# Patient Record
Sex: MALE | Race: ASIAN | Employment: OTHER | URBAN - METROPOLITAN AREA
[De-identification: names, ages, dates, MRNs, and addresses within clinical notes are randomized per-mention and may not be internally consistent; named-entity substitution may affect disease eponyms.]

---

## 2017-01-14 PROCEDURE — 84460 ALANINE AMINO (ALT) (SGPT): CPT | Performed by: FAMILY MEDICINE

## 2017-01-14 PROCEDURE — 36415 COLL VENOUS BLD VENIPUNCTURE: CPT | Performed by: FAMILY MEDICINE

## 2017-11-21 PROBLEM — M77.01 MEDIAL EPICONDYLITIS OF ELBOW, RIGHT: Status: ACTIVE | Noted: 2017-11-21

## 2017-11-21 PROBLEM — M25.521 RIGHT ELBOW PAIN: Status: ACTIVE | Noted: 2017-11-21

## 2019-03-01 PROCEDURE — 86803 HEPATITIS C AB TEST: CPT | Performed by: DERMATOLOGY

## 2019-03-01 PROCEDURE — 86706 HEP B SURFACE ANTIBODY: CPT | Performed by: DERMATOLOGY

## 2019-03-01 PROCEDURE — 86704 HEP B CORE ANTIBODY TOTAL: CPT | Performed by: DERMATOLOGY

## 2019-03-01 PROCEDURE — 86480 TB TEST CELL IMMUN MEASURE: CPT | Performed by: DERMATOLOGY

## 2019-03-01 PROCEDURE — 87340 HEPATITIS B SURFACE AG IA: CPT | Performed by: DERMATOLOGY

## 2021-08-08 ENCOUNTER — HOSPITAL ENCOUNTER (EMERGENCY)
Age: 26
Discharge: HOME | End: 2021-08-08
Attending: EMERGENCY MEDICINE
Payer: COMMERCIAL

## 2021-08-08 ENCOUNTER — APPOINTMENT (OUTPATIENT)
Dept: MRI IMAGING | Age: 26
End: 2021-08-08
Attending: EMERGENCY MEDICINE
Payer: COMMERCIAL

## 2021-08-08 VITALS
RESPIRATION RATE: 16 BRPM | WEIGHT: 173 LBS | BODY MASS INDEX: 27.8 KG/M2 | HEART RATE: 99 BPM | HEIGHT: 66 IN | DIASTOLIC BLOOD PRESSURE: 87 MMHG | OXYGEN SATURATION: 100 % | TEMPERATURE: 98 F | SYSTOLIC BLOOD PRESSURE: 104 MMHG

## 2021-08-08 DIAGNOSIS — R20.2 PARESTHESIAS: Primary | ICD-10-CM

## 2021-08-08 LAB
ANION GAP SERPL CALC-SCNC: 6 MMOL/L (ref 0–18)
BASOPHILS # BLD AUTO: 0.02 X10(3) UL (ref 0–0.2)
BASOPHILS NFR BLD AUTO: 0.2 %
BUN BLD-MCNC: 9 MG/DL (ref 7–18)
CALCIUM BLD-MCNC: 9.1 MG/DL (ref 8.5–10.1)
CHLORIDE SERPL-SCNC: 106 MMOL/L (ref 98–112)
CO2 SERPL-SCNC: 26 MMOL/L (ref 21–32)
CREAT BLD-MCNC: 1.09 MG/DL
EOSINOPHIL # BLD AUTO: 0.26 X10(3) UL (ref 0–0.7)
EOSINOPHIL NFR BLD AUTO: 3 %
ERYTHROCYTE [DISTWIDTH] IN BLOOD BY AUTOMATED COUNT: 12.2 %
GLUCOSE BLD-MCNC: 86 MG/DL (ref 70–99)
HCT VFR BLD AUTO: 44.8 %
HGB BLD-MCNC: 15.6 G/DL
IMM GRANULOCYTES # BLD AUTO: 0.02 X10(3) UL (ref 0–1)
IMM GRANULOCYTES NFR BLD: 0.2 %
LYMPHOCYTES # BLD AUTO: 2.1 X10(3) UL (ref 1–4)
LYMPHOCYTES NFR BLD AUTO: 24.1 %
MCH RBC QN AUTO: 30.6 PG (ref 26–34)
MCHC RBC AUTO-ENTMCNC: 34.8 G/DL (ref 31–37)
MCV RBC AUTO: 88 FL
MONOCYTES # BLD AUTO: 0.64 X10(3) UL (ref 0.1–1)
MONOCYTES NFR BLD AUTO: 7.3 %
NEUTROPHILS # BLD AUTO: 5.69 X10 (3) UL (ref 1.5–7.7)
NEUTROPHILS # BLD AUTO: 5.69 X10(3) UL (ref 1.5–7.7)
NEUTROPHILS NFR BLD AUTO: 65.2 %
OSMOLALITY SERPL CALC.SUM OF ELEC: 284 MOSM/KG (ref 275–295)
PLATELET # BLD AUTO: 272 10(3)UL (ref 150–450)
POTASSIUM SERPL-SCNC: 4.1 MMOL/L (ref 3.5–5.1)
RBC # BLD AUTO: 5.09 X10(6)UL
SODIUM SERPL-SCNC: 138 MMOL/L (ref 136–145)
WBC # BLD AUTO: 8.7 X10(3) UL (ref 4–11)

## 2021-08-08 PROCEDURE — 72156 MRI NECK SPINE W/O & W/DYE: CPT | Performed by: EMERGENCY MEDICINE

## 2021-08-08 PROCEDURE — 96360 HYDRATION IV INFUSION INIT: CPT

## 2021-08-08 PROCEDURE — 85025 COMPLETE CBC W/AUTO DIFF WBC: CPT | Performed by: EMERGENCY MEDICINE

## 2021-08-08 PROCEDURE — 70553 MRI BRAIN STEM W/O & W/DYE: CPT | Performed by: EMERGENCY MEDICINE

## 2021-08-08 PROCEDURE — 99284 EMERGENCY DEPT VISIT MOD MDM: CPT

## 2021-08-08 PROCEDURE — 80048 BASIC METABOLIC PNL TOTAL CA: CPT | Performed by: EMERGENCY MEDICINE

## 2021-08-08 PROCEDURE — A9575 INJ GADOTERATE MEGLUMI 0.1ML: HCPCS | Performed by: EMERGENCY MEDICINE

## 2021-08-08 NOTE — ED INITIAL ASSESSMENT (HPI)
Pt states he started feeling tingling in his left fingers 1 month ago, lest week Pt states the tingling/numbness started to radiate up his left arm.  Friday Pt states he started to get the same tingling/numbness in his right arm and yesterday pt states he n

## 2021-08-08 NOTE — ED PROVIDER NOTES
Patient Seen in: [facility] In [place]      History   Patient presents with:  Numbness    Stated Complaint: Bilateal hand/arm tingling x 1 month. Right foot tingling since yesterday.      HPI/Subjective:   HPI    Patient tells me that social only reported    Drug use: No             Review of Systems    Positive for stated complaint: Bilateal hand/arm tingling x 1 month. Right foot tingling since yesterday. Other systems are as noted in HPI. Constitutional and vital signs reviewed. The following orders were created for panel order CBC With Differential With Platelet.   Procedure                               Abnormality         Status                     ---------                               -----------         ------ [facility]  [phone]  Call  choose option 1 for general neurology          Medications Prescribed:  Current Discharge Medication List

## 2021-08-12 ENCOUNTER — LAB ENCOUNTER (OUTPATIENT)
Dept: LAB | Age: 26
End: 2021-08-12
Attending: Other
Payer: COMMERCIAL

## 2021-08-12 ENCOUNTER — OFFICE VISIT (OUTPATIENT)
Dept: NEUROLOGY | Facility: CLINIC | Age: 26
End: 2021-08-12
Payer: COMMERCIAL

## 2021-08-12 ENCOUNTER — TELEPHONE (OUTPATIENT)
Dept: NEUROLOGY | Facility: CLINIC | Age: 26
End: 2021-08-12

## 2021-08-12 VITALS
RESPIRATION RATE: 16 BRPM | BODY MASS INDEX: 27.8 KG/M2 | HEART RATE: 96 BPM | HEIGHT: 66 IN | SYSTOLIC BLOOD PRESSURE: 137 MMHG | WEIGHT: 173 LBS | DIASTOLIC BLOOD PRESSURE: 85 MMHG

## 2021-08-12 DIAGNOSIS — Z79.899 ADALIMUMAB (HUMIRA) LONG-TERM USE: ICD-10-CM

## 2021-08-12 DIAGNOSIS — L40.9 PSORIASIS: ICD-10-CM

## 2021-08-12 DIAGNOSIS — R20.2 PARESTHESIAS: ICD-10-CM

## 2021-08-12 DIAGNOSIS — G37.9 DEMYELINATING DISEASE (HCC): ICD-10-CM

## 2021-08-12 DIAGNOSIS — G37.9 DEMYELINATING DISEASE (HCC): Primary | ICD-10-CM

## 2021-08-12 LAB
CRP SERPL-MCNC: <0.29 MG/DL (ref ?–0.3)
RHEUMATOID FACT SERPL-ACNC: <10 IU/ML (ref ?–15)
SED RATE-ML: 9 MM/HR
VIT B12 SERPL-MCNC: 721 PG/ML (ref 193–986)

## 2021-08-12 PROCEDURE — 85652 RBC SED RATE AUTOMATED: CPT | Performed by: OTHER

## 2021-08-12 PROCEDURE — 86431 RHEUMATOID FACTOR QUANT: CPT | Performed by: OTHER

## 2021-08-12 PROCEDURE — 86255 FLUORESCENT ANTIBODY SCREEN: CPT | Performed by: OTHER

## 2021-08-12 PROCEDURE — 84165 PROTEIN E-PHORESIS SERUM: CPT | Performed by: OTHER

## 2021-08-12 PROCEDURE — 86038 ANTINUCLEAR ANTIBODIES: CPT | Performed by: OTHER

## 2021-08-12 PROCEDURE — 86140 C-REACTIVE PROTEIN: CPT | Performed by: OTHER

## 2021-08-12 PROCEDURE — 83883 ASSAY NEPHELOMETRY NOT SPEC: CPT | Performed by: OTHER

## 2021-08-12 PROCEDURE — 99204 OFFICE O/P NEW MOD 45 MIN: CPT | Performed by: OTHER

## 2021-08-12 PROCEDURE — 83876 ASSAY MYELOPEROXIDASE: CPT | Performed by: OTHER

## 2021-08-12 PROCEDURE — 82607 VITAMIN B-12: CPT | Performed by: OTHER

## 2021-08-12 PROCEDURE — 86334 IMMUNOFIX E-PHORESIS SERUM: CPT | Performed by: OTHER

## 2021-08-12 PROCEDURE — 3075F SYST BP GE 130 - 139MM HG: CPT | Performed by: OTHER

## 2021-08-12 PROCEDURE — 83516 IMMUNOASSAY NONANTIBODY: CPT | Performed by: OTHER

## 2021-08-12 PROCEDURE — 3008F BODY MASS INDEX DOCD: CPT | Performed by: OTHER

## 2021-08-12 PROCEDURE — 3079F DIAST BP 80-89 MM HG: CPT | Performed by: OTHER

## 2021-08-12 RX ORDER — GABAPENTIN 100 MG/1
100 CAPSULE ORAL 3 TIMES DAILY
Qty: 90 CAPSULE | Refills: 0 | Status: SHIPPED | OUTPATIENT
Start: 2021-08-12 | End: 2021-08-26

## 2021-08-12 NOTE — H&P
[facility] Patient / Consult Visit    Maykel Martinez is a 27year old male. Referring MD: No ref.  provider found    Patient presents with:  Neurologic Problem: C/O of numbmess and tingling in upper and lower Vaping Use: Never used    Alcohol use:  Yes      Alcohol/week: 1.0 standard drinks      Types: 1 Standard drinks or equivalent per week      Comment: social only reported    Drug use: No    Family History   Problem Relation Age of Onset   • Heart Disease Ma no cyanosis, peripheral pulses intact    Neck: Supple; full range of motion; no carotid bruits    Mental status:  Alert and oriented to time, place, person, and situation  Speech: fluent  Language: normal naming, repetition, and comprehension  Memory: norm Prelim Neutrophil Abs      1.50 - 7.70 x10 (3) uL 5.69   Neutrophils Absolute      1.50 - 7.70 x10(3) uL 5.69   Lymphocytes Absolute      1.00 - 4.00 x10(3) uL 2.10   Monocytes Absolute      0.10 - 1.00 x10(3) uL 0.64   Eosinophils Absolute      0.00 - 0 lesions with low T1 signal R > L           MRI cervical spine: independently reviewed; no obvious spinal canal stenosis or intramedullary signal change or demyelinating plaques noted;     IMPRESSION AND PLAN:   Uday Ohara is a 27year old male with PMH with his dermatologist further. Given the progressive paresthesias that he is experiencing, along with findings on MRI of the the brain, further immediate evaluation for potential demyelinating disease or mimickers of demyelinating disease is warranted. C-REACTIVE PROTEIN, BERTA, DIRECT, REFLEX TO 9         ENAS, ANCA PANEL VASCULITIS W/REFLEX,         RHEUMATOID ARTHRITIS FACTOR, VITAMIN B12, SED         RATE, WESTERGREN (AUTOMATED), CELL COUNT, CSF,         CYTOLOGY FLUIDS, GLUCOSE, CEREBROSPINAL FLU

## 2021-08-12 NOTE — TELEPHONE ENCOUNTER
Dr. Ezra Georges requested expedited pa for MRI Spine Thoracic. Advised pt will contact him once pa completed.     Call pt [phone]

## 2021-08-13 LAB — ANA SCREEN: NEGATIVE

## 2021-08-15 LAB
MYELOPEROX ANTIBODIES, IGG: 1 AU/ML
SERINE PROTEASE3, IGG: 3 AU/ML

## 2021-08-16 ENCOUNTER — TELEPHONE (OUTPATIENT)
Dept: NEUROLOGY | Facility: CLINIC | Age: 26
End: 2021-08-16

## 2021-08-16 NOTE — TELEPHONE ENCOUNTER
Patient presented to the office with forms for his employer. He gave the work excuse letter from Dr. Moise Handy, but they wanted Dr. Moise Handy to fill out an accommodation form.      Patient explained that he does plan to return to [place] to resume work on 8/27 Reason for Call:  Other call back    Detailed comments: states is confused how to use neb solutions-separate or mixed together    Phone Number Patient can be reached at: Cell number on file:    Telephone Information:   Mobile [phone]       Best Time: wants to start today    Can we leave a detailed message on this number? YES    Call taken on 3/16/2018 at 12:14 PM by ABISAI HOLDER

## 2021-08-17 ENCOUNTER — LAB ENCOUNTER (OUTPATIENT)
Dept: LAB | Facility: HOSPITAL | Age: 26
End: 2021-08-17
Attending: Other
Payer: COMMERCIAL

## 2021-08-17 DIAGNOSIS — Z79.899 ADALIMUMAB (HUMIRA) LONG-TERM USE: ICD-10-CM

## 2021-08-17 DIAGNOSIS — L40.9 PSORIASIS: ICD-10-CM

## 2021-08-17 DIAGNOSIS — G37.9 DEMYELINATING DISEASE (HCC): ICD-10-CM

## 2021-08-17 LAB
ALBUMIN SERPL ELPH-MCNC: 5.07 G/DL (ref 3.75–5.21)
ALBUMIN/GLOB SERPL: 1.52 {RATIO} (ref 1–2)
ALPHA1 GLOB SERPL ELPH-MCNC: 0.25 G/DL (ref 0.19–0.46)
ALPHA2 GLOB SERPL ELPH-MCNC: 0.68 G/DL (ref 0.48–1.05)
B-GLOBULIN SERPL ELPH-MCNC: 0.9 G/DL (ref 0.68–1.23)
GAMMA GLOB SERPL ELPH-MCNC: 1.5 G/DL (ref 0.62–1.7)
KAPPA LC FREE SER-MCNC: 1.28 MG/DL (ref 0.33–1.94)
KAPPA LC FREE/LAMBDA FREE SER NEPH: 1.18 {RATIO} (ref 0.26–1.65)
LAMBDA LC FREE SERPL-MCNC: 1.09 MG/DL (ref 0.57–2.63)
M PROTEIN MFR SERPL ELPH: 8.4 G/DL (ref 6.4–8.2)

## 2021-08-18 LAB — SARS-COV-2 RNA RESP QL NAA+PROBE: NOT DETECTED

## 2021-08-18 NOTE — TELEPHONE ENCOUNTER
Form completed to allow continuous time off work from 8/8/21 through 8/26/21. Endorsed to Dr. Sophie Thao to review and sign.

## 2021-08-19 ENCOUNTER — HOSPITAL ENCOUNTER (OUTPATIENT)
Dept: MRI IMAGING | Age: 26
Discharge: HOME | End: 2021-08-19
Attending: Other
Payer: COMMERCIAL

## 2021-08-19 DIAGNOSIS — G37.9 DEMYELINATING DISEASE (HCC): ICD-10-CM

## 2021-08-19 DIAGNOSIS — Z79.899 ADALIMUMAB (HUMIRA) LONG-TERM USE: ICD-10-CM

## 2021-08-19 DIAGNOSIS — L40.9 PSORIASIS: ICD-10-CM

## 2021-08-19 PROCEDURE — A9575 INJ GADOTERATE MEGLUMI 0.1ML: HCPCS

## 2021-08-19 PROCEDURE — 72157 MRI CHEST SPINE W/O & W/DYE: CPT | Performed by: OTHER

## 2021-08-19 NOTE — TELEPHONE ENCOUNTER
Paperwork completed and signed by provider. Detailed message left for patient that paperwork is completed and he may present to office to . Paperwork placed at  and PSR placed in locked drawer.     Patient instructed to call office with

## 2021-08-20 ENCOUNTER — NURSE ONLY (OUTPATIENT)
Dept: LAB | Facility: HOSPITAL | Age: 26
End: 2021-08-20
Attending: Other
Payer: COMMERCIAL

## 2021-08-20 ENCOUNTER — HOSPITAL ENCOUNTER (OUTPATIENT)
Dept: GENERAL RADIOLOGY | Facility: HOSPITAL | Age: 26
Discharge: HOME | End: 2021-08-20
Attending: Other
Payer: COMMERCIAL

## 2021-08-20 VITALS
WEIGHT: 173 LBS | SYSTOLIC BLOOD PRESSURE: 140 MMHG | RESPIRATION RATE: 12 BRPM | HEIGHT: 66 IN | DIASTOLIC BLOOD PRESSURE: 70 MMHG | HEART RATE: 76 BPM | TEMPERATURE: 98 F | BODY MASS INDEX: 27.8 KG/M2 | OXYGEN SATURATION: 100 %

## 2021-08-20 DIAGNOSIS — Z79.899 ADALIMUMAB (HUMIRA) LONG-TERM USE: ICD-10-CM

## 2021-08-20 DIAGNOSIS — G37.9 DEMYELINATING DISEASE (HCC): Primary | ICD-10-CM

## 2021-08-20 DIAGNOSIS — L40.9 PSORIASIS: ICD-10-CM

## 2021-08-20 DIAGNOSIS — G37.9 DEMYELINATING DISEASE (HCC): ICD-10-CM

## 2021-08-20 DIAGNOSIS — Z79.620 ADALIMUMAB (HUMIRA) LONG-TERM USE: ICD-10-CM

## 2021-08-20 LAB
CLARITY CSF: CLEAR
COLOR CSF: COLORLESS
GLUCOSE CSF-MCNC: 56 MG/DL (ref 40–70)
INR BLD: 0.97 (ref 0.89–1.11)
PROT PATTERN CSF ELPH-IMP: 89.6 MG/DL (ref 15–45)
PSA SERPL DL<=0.01 NG/ML-MCNC: 13.1 SECONDS (ref 12.2–14.5)
RBC # CSF: 208 /MM3 (ref ?–1)
TOTAL VOLUME CSF: 7 ML
WBC # FLD MANUAL: 1 /MM3 (ref 0–5)

## 2021-08-20 PROCEDURE — 83916 OLIGOCLONAL BANDS: CPT

## 2021-08-20 PROCEDURE — 62328 DX LMBR SPI PNXR W/FLUOR/CT: CPT | Performed by: OTHER

## 2021-08-20 PROCEDURE — 84157 ASSAY OF PROTEIN OTHER: CPT

## 2021-08-20 PROCEDURE — 89050 BODY FLUID CELL COUNT: CPT

## 2021-08-20 PROCEDURE — 36415 COLL VENOUS BLD VENIPUNCTURE: CPT

## 2021-08-20 PROCEDURE — 82784 ASSAY IGA/IGD/IGG/IGM EACH: CPT

## 2021-08-20 PROCEDURE — 82042 OTHER SOURCE ALBUMIN QUAN EA: CPT

## 2021-08-20 PROCEDURE — 85610 PROTHROMBIN TIME: CPT

## 2021-08-20 PROCEDURE — 82040 ASSAY OF SERUM ALBUMIN: CPT

## 2021-08-20 PROCEDURE — 82945 GLUCOSE OTHER FLUID: CPT

## 2021-08-20 PROCEDURE — 88108 CYTOPATH CONCENTRATE TECH: CPT

## 2021-08-20 RX ORDER — SODIUM CHLORIDE 9 MG/ML
INJECTION, SOLUTION INTRAVENOUS CONTINUOUS
Status: DISCONTINUED | OUTPATIENT
Start: 2021-08-20 | End: 2021-08-22

## 2021-08-20 NOTE — IMAGING NOTE
PAtient received in RAD holding  Plan for Lumbar puncture today with DR Handy    Name  and allergies verified  Pertinent labs and history reviewed  NPO since last noc  Report to [facility].

## 2021-08-22 LAB
ALBUMIN, CSF: 65 MG/DL
CSF IGG/ALBUMIN RATIO: 0.13 RATIO
IMMUNOGLOBULIN G CSF: 8.5 MG/DL

## 2021-08-24 LAB
ALBUMIN INDEX: 12.9 RATIO
ALBUMIN, CSF: 64 MG/DL
ALBUMIN, SERUM/PLASMA, NEPH: 4963 MG/DL
CSF IGG SYNTHESIS RATE: <0 MG/D
CSF IGG/ALBUMIN RATIO: 0.14 RATIO
CSF OLIGOCLONAL BANDS NUMBER: 0 BANDS
CSF OLIGOCLONAL BANDS: NEGATIVE
IGG INDEX: 0.43 RATIO
IMMUNOGLOBULIN G CSF: 8.7 MG/DL
IMMUNOGLOBULIN G: 1579 MG/DL
NON GYNE INTERPRETATION: NEGATIVE

## 2021-08-26 ENCOUNTER — OFFICE VISIT (OUTPATIENT)
Dept: NEUROLOGY | Facility: CLINIC | Age: 26
End: 2021-08-26
Payer: COMMERCIAL

## 2021-08-26 VITALS
HEART RATE: 90 BPM | SYSTOLIC BLOOD PRESSURE: 125 MMHG | WEIGHT: 173 LBS | BODY MASS INDEX: 27.8 KG/M2 | HEIGHT: 66 IN | DIASTOLIC BLOOD PRESSURE: 77 MMHG | RESPIRATION RATE: 16 BRPM

## 2021-08-26 DIAGNOSIS — Z79.899 ADALIMUMAB (HUMIRA) LONG-TERM USE: Primary | ICD-10-CM

## 2021-08-26 DIAGNOSIS — G37.9 DEMYELINATING DISEASE (HCC): ICD-10-CM

## 2021-08-26 DIAGNOSIS — L40.9 PSORIASIS: ICD-10-CM

## 2021-08-26 DIAGNOSIS — R20.2 PARESTHESIAS: ICD-10-CM

## 2021-08-26 PROCEDURE — 3074F SYST BP LT 130 MM HG: CPT | Performed by: OTHER

## 2021-08-26 PROCEDURE — 3008F BODY MASS INDEX DOCD: CPT | Performed by: OTHER

## 2021-08-26 PROCEDURE — 3078F DIAST BP <80 MM HG: CPT | Performed by: OTHER

## 2021-08-26 PROCEDURE — 99214 OFFICE O/P EST MOD 30 MIN: CPT | Performed by: OTHER

## 2021-08-26 RX ORDER — GABAPENTIN 100 MG/1
200 CAPSULE ORAL 3 TIMES DAILY
Qty: 180 CAPSULE | Refills: 0 | Status: SHIPPED | OUTPATIENT
Start: 2021-08-26

## 2021-08-26 RX ORDER — METHYLPREDNISOLONE 4 MG/1
TABLET ORAL
Qty: 1 EACH | Refills: 0 | Status: SHIPPED | OUTPATIENT
Start: 2021-08-26

## 2021-08-27 NOTE — PROGRESS NOTES
[facility] Progress Note    HPI  Patient presents with:  Neurologic Problem: Demyelinating disease anf follow up aon labs       As per my initial H&P from 8/12/2021   \" Radha Solomon is a 27year old, who presents for evaluation of ti History reviewed. No pertinent surgical history.   Family History   Problem Relation Age of Onset   • Heart Disease Maternal Grandmother         CHF   • Diabetes Maternal Grandmother    • Stroke Paternal Grandfather    • High Blood Pressure Paternal Gra as noted in HPI.     Exam:  /77 (BP Location: Left arm, Patient Position: Sitting, Cuff Size: large)   Pulse 90   Resp 16   Ht 66\"   Wt 173 lb (78.5 kg)   BMI 27.92 kg/m²   Estimated body mass index is 27.92 kg/m² as calculated from the following: CSF      0.0 - 6.0 mg/dL 8.5 (H)   Albumin, CSF      0 - 35 mg/dL 65 (H)   ALBUMIN, SERUM/PLASMA, NEPH      3500 - 5200 mg/dL    ALBUMIN INDEX      0.0 - 9.0 ratio    CSF IGG SYNTHESIS RATE      <=8.0 mg/d    IGG INDEX      0.28 - 0.66 ratio    CSF IGG/ALB 0.26 - 1.65    IMMUNOGLOBULIN G      768 - 1632 mg/dL 1579   IMMUNOGLOBULIN G CSF      0.0 - 6.0 mg/dL 8.7 (H)   Albumin, CSF      0 - 35 mg/dL 64 (H)   ALBUMIN, SERUM/PLASMA, NEPH      3500 - 5200 mg/dL 4963   ALBUMIN INDEX      0.0 - 9.0 ratio 12.9 (H) IMMUNOGLOBULIN G CSF      0.0 - 6.0 mg/dL    Albumin, CSF      0 - 35 mg/dL    ALBUMIN, SERUM/PLASMA, NEPH      3500 - 5200 mg/dL    ALBUMIN INDEX      0.0 - 9.0 ratio    CSF IGG SYNTHESIS RATE      <=8.0 mg/d    IGG INDEX      0.28 - 0.66 ratio    CSF I Prominence of the epidural fat in the midthoracic spine is causing mild to moderate spinal canal stenosis with mild effacement of the thoracic spinal cord.            Prior as noted below:     Per prior review     MRI BRAIN (W+WO) (CPT=70553)     Result Da improved on its own, but otherwise has no clear clinical history of events that may suggest demyelinating disease or multiple sclerosis.     MRI of the brain was reviewed and demonstrates multiple T2/flair white matter hyperintensities in the periventricul noted above     (R20.2) Paresthesias  Plan: gabapentin 100 MG Oral Cap, EMG ([address] [place] [address]),         methylPREDNISolone (MEDROL) 4 MG Oral Tablet         Therapy Pack        As noted above     (L40.9) Psoriasis  Plan: as no

## 2021-09-14 ENCOUNTER — TELEPHONE (OUTPATIENT)
Dept: NEUROLOGY | Facility: CLINIC | Age: 26
End: 2021-09-14

## 2021-09-14 DIAGNOSIS — Z79.899 ADALIMUMAB (HUMIRA) LONG-TERM USE: Primary | ICD-10-CM

## 2021-09-14 DIAGNOSIS — G37.9 DEMYELINATING DISEASE (HCC): ICD-10-CM

## 2021-09-15 NOTE — TELEPHONE ENCOUNTER
Called and left detailed message for patient that repeat MRI orders have been entered but not to complete until November.  Patient to wait until her hears from prior authorization team.

## 2021-09-16 ENCOUNTER — TELEPHONE (OUTPATIENT)
Dept: NEUROLOGY | Facility: CLINIC | Age: 26
End: 2021-09-16

## 2021-10-07 ENCOUNTER — PROCEDURE VISIT (OUTPATIENT)
Dept: NEUROLOGY | Facility: CLINIC | Age: 26
End: 2021-10-07
Payer: COMMERCIAL

## 2021-10-07 DIAGNOSIS — Z79.899 ADALIMUMAB (HUMIRA) LONG-TERM USE: ICD-10-CM

## 2021-10-07 DIAGNOSIS — R20.2 PARESTHESIAS: Primary | ICD-10-CM

## 2021-10-07 PROCEDURE — 95911 NRV CNDJ TEST 9-10 STUDIES: CPT | Performed by: OTHER

## 2021-10-07 PROCEDURE — 95886 MUSC TEST DONE W/N TEST COMP: CPT | Performed by: OTHER

## 2021-10-07 NOTE — PROCEDURES
[address]  [address] [facility] [place], [address] [place] [address]  Ph: [phone]  FAX: [phone]        Full Name: Jack Born Gender: Male  Patient ID: [id number] YOB: 1995      Visit Date: 10/7/2021 09:19  Age: ms mVms  cm ms m/s   L Median - APB      Wrist APB 3.02 8.5 100 6.15 29.2 Wrist - APB 7        Elbow APB 7.19 8.7 102 6.41 29.3 Elbow - Wrist 24 4.17 58   L Ulnar - ADM      Wrist ADM 2.55 12.6 100 4.95 36.3 Wrist - ADM 7        B. Elbow ADM 6.25 11.6 92.3 interlatency prolongation of median relative to ulnar nerve.   6.  Right common peroneal motor response was normal; F wave response was normal.  7.  Right tibial motor response was normal; F wave response was normal.  8.  Left median motor response was norm

## 2021-11-19 ENCOUNTER — HOSPITAL ENCOUNTER (OUTPATIENT)
Dept: MRI IMAGING | Age: 26
Discharge: HOME | End: 2021-11-19
Attending: Other
Payer: COMMERCIAL

## 2021-11-19 DIAGNOSIS — G37.9 DEMYELINATING DISEASE (HCC): ICD-10-CM

## 2021-11-19 DIAGNOSIS — Z79.899 ADALIMUMAB (HUMIRA) LONG-TERM USE: ICD-10-CM

## 2021-11-19 PROCEDURE — 70553 MRI BRAIN STEM W/O & W/DYE: CPT | Performed by: OTHER

## 2021-11-19 PROCEDURE — A9575 INJ GADOTERATE MEGLUMI 0.1ML: HCPCS | Performed by: OTHER

## 2025-04-15 PROBLEM — R12 CHRONIC HEARTBURN: Status: ACTIVE | Noted: 2025-04-15

## 2025-04-15 PROBLEM — R19.4 CHANGE IN BOWEL HABIT: Status: ACTIVE | Noted: 2025-04-15

## 2025-04-30 ENCOUNTER — LAB ENCOUNTER (OUTPATIENT)
Dept: LAB | Age: 30
End: 2025-04-30
Attending: INTERNAL MEDICINE
Payer: COMMERCIAL

## 2025-04-30 DIAGNOSIS — R07.89 ATYPICAL CHEST PAIN: ICD-10-CM

## 2025-04-30 DIAGNOSIS — R12 CHRONIC HEARTBURN: ICD-10-CM

## 2025-04-30 DIAGNOSIS — R19.4 CHANGE IN BOWEL HABIT: ICD-10-CM

## 2025-04-30 LAB
ALBUMIN SERPL-MCNC: 5.3 G/DL (ref 3.2–4.8)
ALBUMIN/GLOB SERPL: 1.6 {RATIO} (ref 1–2)
ALP LIVER SERPL-CCNC: 84 U/L (ref 45–117)
ALT SERPL-CCNC: 77 U/L (ref 10–49)
ANION GAP SERPL CALC-SCNC: 10 MMOL/L (ref 0–18)
AST SERPL-CCNC: 41 U/L (ref ?–34)
BASOPHILS # BLD AUTO: 0.03 X10(3) UL (ref 0–0.2)
BASOPHILS NFR BLD AUTO: 0.4 %
BILIRUB SERPL-MCNC: 0.5 MG/DL (ref 0.3–1.2)
BUN BLD-MCNC: 13 MG/DL (ref 9–23)
CALCIUM BLD-MCNC: 10.4 MG/DL (ref 8.7–10.6)
CHLORIDE SERPL-SCNC: 103 MMOL/L (ref 98–112)
CO2 SERPL-SCNC: 27 MMOL/L (ref 21–32)
CREAT BLD-MCNC: 1.18 MG/DL (ref 0.7–1.3)
CRP SERPL-MCNC: <0.4 MG/DL (ref ?–0.5)
EGFRCR SERPLBLD CKD-EPI 2021: 84 ML/MIN/1.73M2 (ref 60–?)
EOSINOPHIL # BLD AUTO: 0.42 X10(3) UL (ref 0–0.7)
EOSINOPHIL NFR BLD AUTO: 6.1 %
ERYTHROCYTE [DISTWIDTH] IN BLOOD BY AUTOMATED COUNT: 12.2 %
FASTING STATUS PATIENT QL REPORTED: YES
GLOBULIN PLAS-MCNC: 3.4 G/DL (ref 2–3.5)
GLUCOSE BLD-MCNC: 77 MG/DL (ref 70–99)
HCT VFR BLD AUTO: 49.4 % (ref 39–53)
HGB BLD-MCNC: 17.2 G/DL (ref 13–17.5)
IMM GRANULOCYTES # BLD AUTO: 0.03 X10(3) UL (ref 0–1)
IMM GRANULOCYTES NFR BLD: 0.4 %
LYMPHOCYTES # BLD AUTO: 2.05 X10(3) UL (ref 1–4)
LYMPHOCYTES NFR BLD AUTO: 29.9 %
MCH RBC QN AUTO: 30.9 PG (ref 26–34)
MCHC RBC AUTO-ENTMCNC: 34.8 G/DL (ref 31–37)
MCV RBC AUTO: 88.7 FL (ref 80–100)
MONOCYTES # BLD AUTO: 0.43 X10(3) UL (ref 0.1–1)
MONOCYTES NFR BLD AUTO: 6.3 %
NEUTROPHILS # BLD AUTO: 3.9 X10 (3) UL (ref 1.5–7.7)
NEUTROPHILS # BLD AUTO: 3.9 X10(3) UL (ref 1.5–7.7)
NEUTROPHILS NFR BLD AUTO: 56.9 %
OSMOLALITY SERPL CALC.SUM OF ELEC: 289 MOSM/KG (ref 275–295)
PLATELET # BLD AUTO: 300 10(3)UL (ref 150–450)
POTASSIUM SERPL-SCNC: 4.1 MMOL/L (ref 3.5–5.1)
PROT SERPL-MCNC: 8.7 G/DL (ref 5.7–8.2)
RBC # BLD AUTO: 5.57 X10(6)UL (ref 4.3–5.7)
SODIUM SERPL-SCNC: 140 MMOL/L (ref 136–145)
WBC # BLD AUTO: 6.9 X10(3) UL (ref 4–11)

## 2025-04-30 PROCEDURE — 85025 COMPLETE CBC W/AUTO DIFF WBC: CPT

## 2025-04-30 PROCEDURE — 36415 COLL VENOUS BLD VENIPUNCTURE: CPT

## 2025-04-30 PROCEDURE — 86140 C-REACTIVE PROTEIN: CPT

## 2025-04-30 PROCEDURE — 80053 COMPREHEN METABOLIC PANEL: CPT

## 2025-05-08 ENCOUNTER — LAB ENCOUNTER (OUTPATIENT)
Dept: LAB | Age: 30
End: 2025-05-08
Attending: INTERNAL MEDICINE
Payer: COMMERCIAL

## 2025-05-08 DIAGNOSIS — R74.8 ELEVATED LIVER ENZYMES: ICD-10-CM

## 2025-05-08 LAB
CERULOPLASMIN SERPL-MCNC: 25 MG/DL (ref 20–60)
DEPRECATED HBV CORE AB SER IA-ACNC: 422 NG/ML (ref 50–336)
IRON SATN MFR SERPL: 21 % (ref 20–50)
IRON SERPL-MCNC: 73 UG/DL (ref 65–175)
TOTAL IRON BINDING CAPACITY: 350 UG/DL (ref 250–425)
TRANSFERRIN SERPL-MCNC: 286 MG/DL (ref 215–365)

## 2025-05-08 PROCEDURE — 83540 ASSAY OF IRON: CPT

## 2025-05-08 PROCEDURE — 83516 IMMUNOASSAY NONANTIBODY: CPT

## 2025-05-08 PROCEDURE — 83550 IRON BINDING TEST: CPT

## 2025-05-08 PROCEDURE — 82728 ASSAY OF FERRITIN: CPT

## 2025-05-08 PROCEDURE — 82103 ALPHA-1-ANTITRYPSIN TOTAL: CPT

## 2025-05-08 PROCEDURE — 86038 ANTINUCLEAR ANTIBODIES: CPT

## 2025-05-08 PROCEDURE — 36415 COLL VENOUS BLD VENIPUNCTURE: CPT

## 2025-05-08 PROCEDURE — 82390 ASSAY OF CERULOPLASMIN: CPT

## 2025-05-10 LAB
A-1-ANTITRYPSIN: 128 MG/DL
ACTIN SMOOTH MUSCLE AB: 6 UNITS
M2 MITOCHONDRIAL AB: <20 UNITS

## 2025-05-12 LAB — NUCLEAR IGG TITR SER IF: NEGATIVE {TITER}

## (undated) NOTE — LETTER
Date: 8/12/2021    Patient Name: Cony Brito          To Whom it may concern: This letter has been written at the patient's request. The above patient was seen at the [facility] for treatment of a medical condition.     This patient albarou

## (undated) NOTE — LETTER
10/7/2021    Dear Dr. Damari Price saw your patient, Martha Felton for an procedure visit. Please see my NCS/EMG enclosed below. Let me know if you have any questions.     Thank you  Jaimee Marinelli MD, Neurology  [facility] [place] [facility] Ankle 2.92 3.80 16.3 20.8       L Median, Ulnar - Transcarpal comparison      Median Palm Wrist 1.30 1.77 174.9 220.3 Median Palm - Wrist 8  61      Ulnar Palm Wrist 1.25 1.77 30.8 34.1 Ulnar Palm - Wrist 8  64         Median Palm - Ulnar Palm  0.00 peroneal (Fibular) L5-S1 N None None None None N N N N             Summary:    Nerve conduction studies:  1.   Right sural sensory response was normal.  2.  Left median sensory response was normal.  3.  Left ulnar sensory response was normal.  4.  Left radi

## (undated) NOTE — LETTER
Date & Time: 8/8/2021, 4:04 PM  Patient: Flora Jacobson  Encounter Provider(s):    Darshana Williamson MD       To Whom It May Concern:    Folra Jacobson was seen and treated in our department on 8/8/2021.  He should be excused from work this coming week  If you h

## (undated) NOTE — LETTER
Dear Dr. Michael De Luna saw your patient, David Manzano for an initial visit. Please see my H&P note enclosed below. Let me know if you have any questions.     Thank you  Tenzin Sanon MD, Neurology  [facility]  Pager [phone] shortness of breath, rashes, joint pains, bowel / bladder incontinence or mood issues. Past Medical History:   Diagnosis Date   • Acne 8/8/2011   • Anxiety    • Psoriasis 8/8/2011   • Psoriasis      History reviewed. No pertinent surgical history.   Soc kg/m² as calculated from the following:    Height as of this encounter: 66\". Weight as of this encounter: 173 lb (78.5 kg).     GENERAL: well developed, well nourished, in no apparent distress  SKIN: no rashes  EYES: sclera anicteric, conjunctiva normal Labs  Component      Latest Ref Rng & Units 8/8/2021   WBC      4.0 - 11.0 x10(3) uL 8.7   RBC      4.30 - 5.70 x10(6)uL 5.09   Hemoglobin      13.0 - 17.5 g/dL 15.6   Hematocrit      39.0 - 53.0 % 44.8   Platelet Count      778.6 - 450.0 10(3)uL 272.0   M MRI SPINE CERVICAL (W+WO) (CPT=72156)    Result Date: 8/8/2021  CONCLUSION:  Overall unremarkable MRI cervical spine examination.    Dictated by (CST): Gustavo Donahue MD on 8/08/2021 at 3:53 PM     Finalized by (CST): Gustavo Donahue MD on 8/08/20 addition, patient has been on immunosuppressive therapy with adalimumab (Humira) which may be associated with development of demyelinating disease. However, patient has been on this medication for approximately 2 years, without prior similar symptoms.   He COUNT, CSF,         CYTOLOGY FLUIDS, GLUCOSE, CEREBROSPINAL FLUID,         IGG/ALBUMIN RATIO,CSF, MONOCLONAL PROTEIN         STUDY, PROTEIN, TOTAL, CSF, OLIGOCLONAL BAND         PROFILE, XR LUMBAR PUNCTURE DIAG, INCLD IMG         (CPT=62328), SARS-COV-2 BY